# Patient Record
Sex: FEMALE | Race: WHITE | NOT HISPANIC OR LATINO | Employment: STUDENT | URBAN - METROPOLITAN AREA
[De-identification: names, ages, dates, MRNs, and addresses within clinical notes are randomized per-mention and may not be internally consistent; named-entity substitution may affect disease eponyms.]

---
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
## 2025-05-25 ENCOUNTER — HOSPITAL ENCOUNTER (EMERGENCY)
Facility: HOSPITAL | Age: 25
Discharge: HOME | End: 2025-05-25
Attending: EMERGENCY MEDICINE
Payer: MEDICAID

## 2025-05-25 VITALS
RESPIRATION RATE: 16 BRPM | HEIGHT: 56 IN | SYSTOLIC BLOOD PRESSURE: 154 MMHG | WEIGHT: 102.5 LBS | HEART RATE: 105 BPM | TEMPERATURE: 98 F | BODY MASS INDEX: 23.06 KG/M2 | OXYGEN SATURATION: 100 % | DIASTOLIC BLOOD PRESSURE: 81 MMHG

## 2025-05-25 DIAGNOSIS — S99.921A TOE INJURY, RIGHT, INITIAL ENCOUNTER: Primary | ICD-10-CM

## 2025-05-25 DIAGNOSIS — Z20.2 POSSIBLE EXPOSURE TO STD: ICD-10-CM

## 2025-05-25 DIAGNOSIS — N39.0 URINARY TRACT INFECTION WITH HEMATURIA, SITE UNSPECIFIED: ICD-10-CM

## 2025-05-25 DIAGNOSIS — R31.9 URINARY TRACT INFECTION WITH HEMATURIA, SITE UNSPECIFIED: ICD-10-CM

## 2025-05-25 LAB
B-HCG UR QL: NEGATIVE
BACTERIA #/AREA URNS AUTO: ABNORMAL /HPF
BACTERIA GENITAL QL WET PREP: ABNORMAL
BILIRUB UR QL STRIP.AUTO: NEGATIVE
CLARITY UR: ABNORMAL
CLUE CELLS VAG QL WET PREP: ABNORMAL
COLOR UR AUTO: YELLOW
CTP QC/QA: YES
FILAMENT FUNGI VAG WET PREP-#/AREA: ABNORMAL
GLUCOSE UR QL STRIP: NEGATIVE
HGB UR QL STRIP: NEGATIVE
HOLD SPECIMEN: NORMAL
HYALINE CASTS UR QL AUTO: 0 /LPF (ref 0–1)
KETONES UR QL STRIP: NEGATIVE
LEUKOCYTE ESTERASE UR QL STRIP: ABNORMAL
MICROSCOPIC COMMENT: ABNORMAL
NITRITE UR QL STRIP: NEGATIVE
PH UR STRIP: 7 [PH]
PROT UR QL STRIP: ABNORMAL
RBC #/AREA URNS AUTO: 8 /HPF (ref 0–4)
SP GR UR STRIP: 1.02
SQUAMOUS #/AREA URNS AUTO: 27 /HPF
T VAGINALIS GENITAL QL WET PREP: ABNORMAL
UROBILINOGEN UR STRIP-ACNC: NEGATIVE EU/DL
WBC #/AREA URNS AUTO: >100 /HPF (ref 0–5)
WBC #/AREA VAG WET PREP: ABNORMAL
WBC CLUMPS UR QL AUTO: ABNORMAL
YEAST GENITAL QL WET PREP: ABNORMAL

## 2025-05-25 PROCEDURE — 63600175 PHARM REV CODE 636 W HCPCS

## 2025-05-25 PROCEDURE — 87591 N.GONORRHOEAE DNA AMP PROB: CPT

## 2025-05-25 PROCEDURE — 96372 THER/PROPH/DIAG INJ SC/IM: CPT

## 2025-05-25 PROCEDURE — 87086 URINE CULTURE/COLONY COUNT: CPT | Performed by: NURSE PRACTITIONER

## 2025-05-25 PROCEDURE — 87210 SMEAR WET MOUNT SALINE/INK: CPT

## 2025-05-25 PROCEDURE — 99284 EMERGENCY DEPT VISIT MOD MDM: CPT | Mod: 25

## 2025-05-25 PROCEDURE — 81025 URINE PREGNANCY TEST: CPT | Performed by: NURSE PRACTITIONER

## 2025-05-25 PROCEDURE — 81003 URINALYSIS AUTO W/O SCOPE: CPT | Performed by: NURSE PRACTITIONER

## 2025-05-25 RX ORDER — CEFTRIAXONE 500 MG/1
500 INJECTION, POWDER, FOR SOLUTION INTRAMUSCULAR; INTRAVENOUS
Status: COMPLETED | OUTPATIENT
Start: 2025-05-25 | End: 2025-05-25

## 2025-05-25 RX ORDER — NITROFURANTOIN 25; 75 MG/1; MG/1
100 CAPSULE ORAL 2 TIMES DAILY
Qty: 10 CAPSULE | Refills: 0 | Status: SHIPPED | OUTPATIENT
Start: 2025-05-25 | End: 2025-05-30

## 2025-05-25 RX ADMIN — CEFTRIAXONE SODIUM 500 MG: 500 INJECTION, POWDER, FOR SOLUTION INTRAMUSCULAR; INTRAVENOUS at 04:05

## 2025-05-25 NOTE — ED NOTES
Contacted lab about C. trachomatis/N. Gonorrhoeae testing and existing urine sample already in lab. Lab states cannot find sample.

## 2025-05-25 NOTE — ED NOTES
Discussed discharge paperwork and Rx sent to pharmacy. Patient verbally understood. Told to look out for phone call about results of wet prep testing. Ambulatory with a steady gait out of ED.

## 2025-05-25 NOTE — ED NOTES
Patient provided urine sample cup and explained need for sample. Ambulatory without assistance to restroom.

## 2025-05-25 NOTE — ED NOTES
Patient reports dysuria, hematuria, and urinary frequency that started ~4 days ago. States she has attempted AZO and cranberry juice with no relief. Also complaining of right 5th toe pain that started after striking it ~2 days ago. Toe appears red, tender to touch. Ambulatory with a steady gait.

## 2025-05-25 NOTE — DISCHARGE INSTRUCTIONS
Thank you for letting me care for you today - it was nice to meet you and I hope you feel better soon. I have placed a referral to OBGYN for follow up care. You can call [phone] ([phone]) to schedule. You can also schedule on the [facility]. I have placed a referral to [facility] for Primary Care. You can call [phone] to schedule.     I sent in the following medication to your pharmacy:   Macrobid: 2 times per day for 5 days    Our goal at [facility] is to always give you outstanding care and exceptional service. You may receive a survey by mail or email in the next week about your experience in our ED. We would greatly appreciate you completing and returning the survey. Your feedback provides us with a way to recognize our staff who give very good care and it helps us learn how to improve when your experience was below our aspiration of excellence.     All the best,     Mercedes Villeda, MPH, PA-C  Emergency Department Physician Assistant  [facility], [facility], [facility]

## 2025-05-25 NOTE — FIRST PROVIDER EVALUATION
Emergency Department TeleTriage Encounter Note      CHIEF COMPLAINT    Chief Complaint   Patient presents with    Urinary Tract Infection     Hematuria, urinary frequency, and dysuria x 4 days. Denies relief with azo and cranberry juice.     Toe Injury     Right 5th toe pain x 2 days after hitting it.       VITAL SIGNS   Initial Vitals [05/25/25 1403]   BP Pulse Resp Temp SpO2   (!) 154/81 105 16 98.2 °F (36.8 °C) 100 %      MAP       --            ALLERGIES    Review of patient's allergies indicates:  No Known Allergies    PROVIDER TRIAGE NOTE  Verbal consent for the teletriage evaluation was given by the patient at the start of the evaluation.  All efforts will be made to maintain patient's privacy during the evaluation.      This is a teletriage evaluation of a 24 y.o. female presenting to the ED with c/o dysuria, frequency that started 5 days ago.  Right 5th toe injury 2 nights ago. Limited physical exam via telehealth: The patient is awake, alert, answering questions appropriately and is not in respiratory distress.  As the Teletriage provider, I performed an initial assessment and ordered appropriate labs and imaging studies, if any, to facilitate the patient's care once placed in the ED. Once a room is available, care and a full evaluation will be completed by an alternate ED provider.  Any additional orders and the final disposition will be determined by that provider.  All imaging and labs will not be followed-up by the Teletriage Team, including myself.          ORDERS  Labs Reviewed   URINALYSIS, REFLEX TO URINE CULTURE   POCT URINE PREGNANCY       ED Orders (720h ago, onward)      Start Ordered     Status Ordering Provider    05/25/25 1417 05/25/25 1416  X-Ray Foot Complete Right  1 time imaging         Ordered PAUL ASHFORD    05/25/25 1416 05/25/25 1415  POCT urine pregnancy  Once         Ordered PAUL ASHFORD    05/25/25 1416 05/25/25 1415  Urinalysis, Reflex to Urine Culture Urine, Clean Catch  STAT          Ordered PAUL ASHFORD A              Virtual Visit Note: The provider triage portion of this emergency department evaluation and documentation was performed via [employer], a HIPAA-compliant telemedicine application, in concert with a tele-presenter in the room. A face to face patient evaluation with one of my colleagues will occur once the patient is placed in an emergency department room.      DISCLAIMER: This note was prepared with [employer] voice recognition transcription software. Garbled syntax, mangled pronouns, and other bizarre constructions may be attributed to that software system.

## 2025-05-26 ENCOUNTER — RESULTS FOLLOW-UP (OUTPATIENT)
Dept: EMERGENCY MEDICINE | Facility: HOSPITAL | Age: 25
End: 2025-05-26

## 2025-05-27 LAB
BACTERIA UR CULT: ABNORMAL
C TRACH DNA SPEC QL NAA+PROBE: NOT DETECTED
CTGC SOURCE (OHS) ORD-325: NORMAL
N GONORRHOEA DNA UR QL NAA+PROBE: NOT DETECTED

## 2025-05-27 NOTE — ED PROVIDER NOTES
Encounter Date: 5/25/2025       History     Chief Complaint   Patient presents with    Urinary Tract Infection     Hematuria, urinary frequency, and dysuria x 4 days. Denies relief with azo and cranberry juice.     Toe Injury     Right 5th toe pain x 2 days after hitting it.     Patient is a 24 y.o. female who presents to the ED for evaluation of multiple complaints.     Firstly, patient reports pain and bruising to her right 5th toe after accidentally kicking something 2 days ago. Is able to ambulate. No damage to nailbed.     Secondly, patient reports hematuria, urinary frequency, dysuria x4 days.  Patient has been taking azo and cranberry juice without relief.  Patient does endorse recent unprotected sex.  Says that a few days ago she did have slight increase amount vaginal discharge.  Patient concerned about possible STD exposure. Fever and chills are not reported.  Abdominal pain is not reported. Flank pain is not reported. Patient denies gross hematuria. There is not accompanying nausea, vomiting.     Denies chest pain, shortness of breath, wheezing, stridor, drooling, joint problems, rashes, or any other complaints at this time.          The history is provided by the patient.     Review of patient's allergies indicates:  No Known Allergies  Past Medical History:   Diagnosis Date    ADHD (attention deficit hyperactivity disorder)     Asthma     Depression      No past surgical history on file.  No family history on file.  Social History[1]  Review of Systems   Constitutional:  Negative for chills and fever.   Genitourinary:  Positive for dysuria, frequency, hematuria and vaginal discharge. Negative for difficulty urinating.   Musculoskeletal:  Positive for arthralgias (right 5th toe).   Skin:  Positive for color change (toe bruising).       Physical Exam     Initial Vitals [05/25/25 1403]   BP Pulse Resp Temp SpO2   (!) 154/81 105 16 98.2 °F (36.8 °C) 100 %      MAP       --         Physical  Exam    Constitutional: She appears well-developed and well-nourished.   HENT:   Head: Normocephalic and atraumatic.   Abdominal: She exhibits no distension. There is no abdominal tenderness.   No right CVA tenderness.  No left CVA tenderness. There is no rebound and no guarding.   Genitourinary:    Genitourinary Comments: Declined  exam     Musculoskeletal:        Feet:      Neurological: She is alert.         ED Course   Procedures  Labs Reviewed   CULTURE, URINE - Abnormal       Result Value    Urine Culture >100,000 cfu/ml Gram-negative Rods (*)    WET PREP, GENITAL - Abnormal    WBC Rare (*)     Bacteria Rare (*)     Clue Cells None      TRICHOMONAS  None      BUDDING YEAST None      FUNGAL HYPHAE None     URINALYSIS, REFLEX TO URINE CULTURE - Abnormal    Color, UA Yellow      Appearance, UA Hazy (*)     pH, UA 7.0      Spec Grav UA 1.025      Protein, UA 1+ (*)     Glucose, UA Negative      Ketones, UA Negative      Bilirubin, UA Negative      Blood, UA Negative      Nitrites, UA Negative      Urobilinogen, UA Negative      Leukocyte Esterase, UA 3+ (*)    URINALYSIS MICROSCOPIC - Abnormal    RBC, UA 8 (*)     WBC, UA >100 (*)     WBC Clumps, UA Few (*)     Bacteria, UA Many (*)     Squamous Epithelial Cells, UA 27      Hyaline Casts, UA 0      Microscopic Comment       GREY TOP URINE HOLD    Extra Tube Hold for add-ons.     C. TRACHOMATIS/N. GONORRHOEAE BY AMP DNA   POCT URINE PREGNANCY    POC Preg Test, Ur Negative       Acceptable Yes            Imaging Results              X-Ray Foot Complete Right (Final result)  Result time 05/25/25 15:38:17      Final result by Eder Sorto MD (05/25/25 15:38:17)                   Impression:      1. No acute displaced fracture or dislocation of the foot.      Electronically signed by: Eder Sorto MD  Date:    05/25/2025  Time:    15:38               Narrative:    EXAMINATION:  XR FOOT COMPLETE 3 VIEW RIGHT    CLINICAL HISTORY:  . Injury,  unspecified, initial encounter    TECHNIQUE:  AP, lateral, and oblique views of the right foot were performed.    COMPARISON:  None    FINDINGS:  Three views right foot.    No acute displaced fracture or dislocation foot.  No radiopaque foreign body.  No significant edema.                                       Medications   cefTRIAXone injection 500 mg (500 mg Intramuscular Given 5/25/25 1633)     Medical Decision Making  Patient is an afebrile, well-appearing 24 y.o. female who here with urinary symptoms and toe injury. Exam without any abdominal or CVAT. Patient is not tolerating PO. VSS.     Differential diagnosis for urinary symptoms includes but is not limited to cystitis, pyelonephritis, ureterolithiasis, nephrolithiasis, chlamydia, gonorrhea, vaginal candidiasis, bacterial vaginosis, Trichomonas, PID, fracture, dislocation, cellulitis, bursitis, muscle strain, ligament tear/sprain, soft tissue contusion, osteoarthritis, subungual hematoma    All historical, clinical, radiographic, findings were reviewed with the patient in detail. UA is is suggestive of UTI will treat with macrobid. UC sent. G/C pending. Vaginal screen not suggestive of BV, candidiasis, trich. Will empirically treat with rocephin. Xray of toe without acute abnormality. There are no concerning features on physical exam to suggest an emergent or life threatening condition. Vital signs do not suggest sepsis. Patient has been counseled regarding the need for follow-up as well as the indication to return to the emergency room should new or worrisome developments occur. Patient instructed to follow up with PCP  for follow up care. The patient verbalized an understanding. The patient is asked if there are any questions or concerns. Patient given discharge instructions. We discuss the case, until all issues are addressed to the patient's satisfaction. Patient understands and is agreeable to the plan.     Amount and/or Complexity of Data  Reviewed  Labs: ordered. Decision-making details documented in ED Course.    Risk  Prescription drug management.               ED Course as of 05/27/25 1432   Sun May 25, 2025   1539 Leukocyte Esterase, UA(!): 3+ [OB]   1539 NITRITE UA: Negative [OB]      ED Course User Index  [OB] Mercedes Villeda PA-C                           Clinical Impression:  Final diagnoses:  [S99.921A] Toe injury, right, initial encounter (Primary)  [Z20.2] Possible exposure to STD  [N39.0, R31.9] Urinary tract infection with hematuria, site unspecified          ED Disposition Condition    Discharge Stable          ED Prescriptions       Medication Sig Dispense Start Date End Date Auth. Provider    nitrofurantoin, macrocrystal-monohydrate, (MACROBID) 100 MG capsule Take 1 capsule (100 mg total) by mouth 2 (two) times daily. for 5 days 10 capsule 5/25/2025 5/30/2025 Mercedes Villeda PA-C          Follow-up Information       Follow up With Specialties Details Why Contact Info Additional Information    [facility] Family Medicine Schedule an appointment as soon as possible for a visit   [address]  [address]  [facility] [address] [phone] Please park in [address] or [address] and use [address]rer. Take Medical Office Bldg. elevators.                   [1]   Social History  Tobacco Use    Smoking status: Never    Smokeless tobacco: Never   Substance Use Topics    Alcohol use: No    Drug use: No        Mercedes Villeda PA-C  05/27/25 1432

## 2025-06-23 ENCOUNTER — TELEPHONE (OUTPATIENT)
Dept: GASTROENTEROLOGY | Facility: CLINIC | Age: 25
End: 2025-06-23
Payer: MEDICAID

## 2025-06-24 ENCOUNTER — HOSPITAL ENCOUNTER (OUTPATIENT)
Facility: HOSPITAL | Age: 25
Discharge: HOME | End: 2025-06-24
Payer: MEDICAID

## 2025-06-24 ENCOUNTER — OFFICE VISIT (OUTPATIENT)
Dept: FAMILY MEDICINE | Facility: HOSPITAL | Age: 25
End: 2025-06-24
Payer: MEDICAID

## 2025-06-24 ENCOUNTER — HOSPITAL ENCOUNTER (OUTPATIENT)
Dept: RADIOLOGY | Facility: HOSPITAL | Age: 25
Discharge: HOME | End: 2025-06-24
Payer: MEDICAID

## 2025-06-24 VITALS
RESPIRATION RATE: 18 BRPM | SYSTOLIC BLOOD PRESSURE: 121 MMHG | OXYGEN SATURATION: 99 % | BODY MASS INDEX: 23.45 KG/M2 | HEART RATE: 71 BPM | DIASTOLIC BLOOD PRESSURE: 87 MMHG | WEIGHT: 104.25 LBS | HEIGHT: 56 IN

## 2025-06-24 DIAGNOSIS — Z13.220 ENCOUNTER FOR SCREENING FOR LIPID DISORDER: ICD-10-CM

## 2025-06-24 DIAGNOSIS — Z11.4 ENCOUNTER FOR SCREENING FOR HIV: ICD-10-CM

## 2025-06-24 DIAGNOSIS — Z11.59 ENCOUNTER FOR HEPATITIS C SCREENING TEST FOR LOW RISK PATIENT: ICD-10-CM

## 2025-06-24 DIAGNOSIS — F32.A DEPRESSION, UNSPECIFIED DEPRESSION TYPE: ICD-10-CM

## 2025-06-24 DIAGNOSIS — M89.9 MULTIFOCAL ABNORMALITY OF BONE: Primary | ICD-10-CM

## 2025-06-24 DIAGNOSIS — M89.9 MULTIFOCAL ABNORMALITY OF BONE: ICD-10-CM

## 2025-06-24 DIAGNOSIS — F41.9 ANXIETY: ICD-10-CM

## 2025-06-24 PROCEDURE — 72082 X-RAY EXAM ENTIRE SPI 2/3 VW: CPT | Mod: TC,PN

## 2025-06-24 PROCEDURE — 72082 X-RAY EXAM ENTIRE SPI 2/3 VW: CPT | Mod: 26,,, | Performed by: RADIOLOGY

## 2025-06-24 PROCEDURE — 99214 OFFICE O/P EST MOD 30 MIN: CPT | Mod: 25

## 2025-06-24 PROCEDURE — 73560 X-RAY EXAM OF KNEE 1 OR 2: CPT | Mod: TC,50,FY

## 2025-06-24 PROCEDURE — 73560 X-RAY EXAM OF KNEE 1 OR 2: CPT | Mod: 26,50,, | Performed by: RADIOLOGY

## 2025-06-24 RX ORDER — SERTRALINE HYDROCHLORIDE 50 MG/1
50 TABLET, FILM COATED ORAL DAILY
Qty: 30 TABLET | Refills: 11 | Status: SHIPPED | OUTPATIENT
Start: 2025-06-24 | End: 2026-06-24

## 2025-06-24 NOTE — PROGRESS NOTES
"Clinic Note  [facility]    Subjective:     Aniyah Anderson is a 24 y.o. year old who presents to clinic today to establish care.    Patient moved back to [place] in February from [place]. Was diagnosed with Fibromyalgia and early-onset arthritis in [place] and is seeking "referrals."    Anxiety/Depression:  - "Lot of trauma."  - Was on Sertraline, wants to be back on it.  - Previous therapy: Sertraline 100mg    Bone abnormality:  - Family history of same bone disease in father. "Gums tight, doesn't allow circulation to teeth." Bones reportedly deteriorate early. Father had hip surgery at 30. Dad was 5'7 in height. Mom was 5'1 in height.  - Bone Age Study 2014: Patient was 14 years old. Bone age on imaging showed 18 years, 2.9 standard deviations above average.    Fibromyalgia:  - Diagnosed by Dr. Salome Segura MD, Rheumatologist at [facility]. Rheumatological lab work largely negative on portal.    IBS:  - Current regimen: Ibgard    Family history:  - Breast cancer, grandmother's sister.  - Lung cancer, father's mom.      Review of Systems negative except for symptoms mentioned in HPI    Gynecological History:  - Menstruation: Patient's last menstrual period was 2025.  - Mammogram: N/A  - Pap smear:  - Sexual History:   - Contraception:    Obstetric History:     Health Maintenance         Date Due Completion Date    Pap Smear Never done ---    TETANUS VACCINE 2022    COVID-19 Vaccine ( season) Never done ---    Influenza Vaccine (Season Ended) 2025    Chlamydia Screening 2026    RSV Vaccine (Age 60+ and Pregnant patients) (1 - 1-dose 75+ series) 2076 ---            Past Medical History:   Diagnosis Date    ADHD (attention deficit hyperactivity disorder)     Asthma     Depression        No past surgical history on file.    No family history on file.    Social History     Socioeconomic History    Marital " status: Single   Tobacco Use    Smoking status: Never    Smokeless tobacco: Never   Substance and Sexual Activity    Alcohol use: No    Drug use: No    Sexual activity: Yes     Partners: Male       Current Medications[1]    Review of patient's allergies indicates:  No Known Allergies      Objective:     Vitals:    06/24/25 0905   BP: 121/87   Pulse: 71   Resp: 18       Wt Readings from Last 1 Encounters:   06/24/25 47.3 kg (104 lb 4.4 oz)       Physical Exam  Constitutional:       General: She is not in acute distress.  HENT:      Mouth/Throat:      Comments: Picture of teeth below  Eyes:      General:         Right eye: No discharge.         Left eye: No discharge.      Conjunctiva/sclera: Conjunctivae normal.      Comments: Glasses   Cardiovascular:      Rate and Rhythm: Normal rate.   Pulmonary:      Effort: Pulmonary effort is normal. No respiratory distress.   Musculoskeletal:      Right lower leg: No edema.      Left lower leg: No edema.      Comments: Short stature  Mild pain in straightening back  No grossly obvious scoliosis of spine, but asymmetry of hips when bending forward   Neurological:      Mental Status: She is alert and oriented to person, place, and time.      Gait: Gait normal.   Psychiatric:         Mood and Affect: Mood normal.         Behavior: Behavior normal.         Assessment/Plan:     Aniyah was seen today for establish care.    Diagnoses and all orders for this visit:    Multifocal abnormality of bone  Chronic condition. Unsure what this is at the present time. Will obtain spinal imaging to assess for scoliosis. With similar presentation in dad, will refer to  for further work up. If imaging reveals degradation, will refer to ortho.  -     Ambulatory referral/consult to Genetics; Future  -     X-Ray Spine Scoliosis 6 or More Views; Future  -     X-Ray Knee 1 or 2 View Bilateral; Future    Anxiety  Depression, unspecified depression type  Chronic condition. Uncontrolled. Triggers  still present. Will restart Sertraline at smaller dose and increase as necessary.  -     sertraline (ZOLOFT) 50 MG tablet; Take 1 tablet (50 mg total) by mouth once daily. Start by cutting the pill in half and taking only half a tablet a day for the first week.    Encounter for screening for lipid disorder  -     Lipid Panel; Future  -     CBC Auto Differential; Future  -     Comprehensive Metabolic Panel; Future    Encounter for hepatitis C screening test for low risk patient  -     HEPATITIS C ANTIBODY; Future    Encounter for screening for HIV  -     HIV 1/2 Ag/Ab (4th Gen); Future      Follow up in about 2 weeks for lab and imaging follow up.    Case discussed with staff: Dr. Rodrick Smith MD PGY-2  [facility]        [1]   Current Outpatient Medications   Medication Sig Dispense Refill    methylphenidate (CONCERTA) 27 MG CR tablet TK 1 T PO  QD IN THE MORNING  0    VENTOLIN HFA 90 mcg/actuation inhaler INL 2 PFS PO Q 4-6 H PRN  1    sertraline (ZOLOFT) 50 MG tablet Take 1 tablet (50 mg total) by mouth once daily. Start by cutting the pill in half and taking only half a tablet a day for the first week. 30 tablet 11     No current facility-administered medications for this visit.

## 2025-06-30 ENCOUNTER — OFFICE VISIT (OUTPATIENT)
Dept: GASTROENTEROLOGY | Facility: CLINIC | Age: 25
End: 2025-06-30
Payer: MEDICAID

## 2025-06-30 VITALS
SYSTOLIC BLOOD PRESSURE: 118 MMHG | WEIGHT: 102.88 LBS | HEART RATE: 63 BPM | DIASTOLIC BLOOD PRESSURE: 82 MMHG | HEIGHT: 56 IN | BODY MASS INDEX: 23.15 KG/M2

## 2025-06-30 DIAGNOSIS — K58.0 IRRITABLE BOWEL SYNDROME WITH DIARRHEA: Primary | ICD-10-CM

## 2025-06-30 PROCEDURE — 1160F RVW MEDS BY RX/DR IN RCRD: CPT | Mod: CPTII,,, | Performed by: NURSE PRACTITIONER

## 2025-06-30 PROCEDURE — 1159F MED LIST DOCD IN RCRD: CPT | Mod: CPTII,,, | Performed by: NURSE PRACTITIONER

## 2025-06-30 PROCEDURE — 99999 PR PBB SHADOW E&M-EST. PATIENT-LVL III: CPT | Mod: PBBFAC,,, | Performed by: NURSE PRACTITIONER

## 2025-06-30 PROCEDURE — 99204 OFFICE O/P NEW MOD 45 MIN: CPT | Mod: S$PBB,,, | Performed by: NURSE PRACTITIONER

## 2025-06-30 PROCEDURE — 3008F BODY MASS INDEX DOCD: CPT | Mod: CPTII,,, | Performed by: NURSE PRACTITIONER

## 2025-06-30 PROCEDURE — 3074F SYST BP LT 130 MM HG: CPT | Mod: CPTII,,, | Performed by: NURSE PRACTITIONER

## 2025-06-30 PROCEDURE — 3079F DIAST BP 80-89 MM HG: CPT | Mod: CPTII,,, | Performed by: NURSE PRACTITIONER

## 2025-06-30 PROCEDURE — 99213 OFFICE O/P EST LOW 20 MIN: CPT | Mod: PBBFAC,PN | Performed by: NURSE PRACTITIONER

## 2025-06-30 RX ORDER — DICYCLOMINE HYDROCHLORIDE 20 MG/1
20 TABLET ORAL
Qty: 120 TABLET | Refills: 5 | Status: SHIPPED | OUTPATIENT
Start: 2025-06-30

## 2025-06-30 NOTE — PROGRESS NOTES
Subjective:       Patient ID: Aniyah Anderson is a 24 y.o. female.    Chief Complaint: Diarrhea and Rectal Bleeding    25 y/o female presents to clinic with c/o diarrhea and rectal bleeding. Hx of IBS-D. Previously established with Dr. En Eugene in [place], [place]; last seen in 2023. EGD and colonoscopy were normal with exception of mild gastritis and IH. Symptoms previously controlled with IBgard and Bentyl. Patient reports 3-5 BMs daily with loose, watery stool. Has intermittent rectal bleeding with BM. Denies rectal pain. Lower abdominal cramping relieved by BM.       Endoscopy History  - 10/12/2023: Internal hemorrhoids noted; otherwise exam normal; biopsies (-) IBD or microscopic colitis  - 11/17/2023 EGD: normal esophagus; gastritis biopsied; normal examined duodenum.    Past Medical History:   Diagnosis Date    ADHD (attention deficit hyperactivity disorder)     Anxiety disorder, unspecified     Asthma     Depression        History reviewed. No pertinent surgical history.    No family history on file.    Social History     Socioeconomic History    Marital status: Single   Tobacco Use    Smoking status: Never    Smokeless tobacco: Never   Substance and Sexual Activity    Alcohol use: No    Drug use: No    Sexual activity: Yes     Partners: Male     Social Drivers of Health     Financial Resource Strain: Unknown (12/28/2020)    Received from [facility]    Overall Financial Resource Strain (CARDIA)     Difficulty of Paying Living Expenses: Patient declined   Food Insecurity: Unknown (12/28/2020)    Received from [facility]    Hunger Vital Sign     Worried About Running Out of Food in the Last Year: Patient declined     Ran Out of Food in the Last Year: Patient declined   Transportation Needs: Unknown (12/28/2020)    Received from [facility]    PRAPARE - Transportation     Lack of Transportation  "(Medical): Patient declined     Lack of Transportation (Non-Medical): Patient declined       Review of Systems   Constitutional:  Negative for appetite change and unexpected weight change.   HENT:  Negative for trouble swallowing.    Respiratory:  Negative for shortness of breath.    Cardiovascular:  Negative for chest pain.   Gastrointestinal:  Positive for abdominal pain and diarrhea.   Hematological:  Negative for adenopathy. Does not bruise/bleed easily.   Psychiatric/Behavioral:  Negative for dysphoric mood.          Objective:     Vitals:    06/30/25 0836   BP: 118/82   BP Location: Right arm   Patient Position: Sitting   Pulse: 63   Weight: 46.6 kg (102 lb 13.5 oz)   Height: 4' 8" (1.422 m)          Physical Exam  Constitutional:       General: She is not in acute distress.  HENT:      Head: Normocephalic.   Eyes:      Conjunctiva/sclera: Conjunctivae normal.   Pulmonary:      Effort: Pulmonary effort is normal. No respiratory distress.   Abdominal:      General: Bowel sounds are normal.      Palpations: Abdomen is soft.      Tenderness: There is no abdominal tenderness.   Musculoskeletal:         General: Normal range of motion.      Cervical back: Normal range of motion.   Skin:     General: Skin is warm and dry.   Neurological:      Mental Status: She is alert and oriented to person, place, and time.   Psychiatric:         Mood and Affect: Mood normal.         Behavior: Behavior normal.               Assessment:         ICD-10-CM ICD-9-CM   1. Irritable bowel syndrome with diarrhea  K58.0 564.1       Plan:       Irritable bowel syndrome with diarrhea  -      IBS diet  -     dicyclomine (BENTYL) 20 mg tablet; Take 1 tablet (20 mg total) by mouth 4 (four) times daily before meals and nightly.  Dispense: 120 tablet; Refill: 5      Follow up in about 1 month (around 7/30/2025) for medication management, If symptoms worsen or fail to improve.     Patient's Medications   New Prescriptions    DICYCLOMINE (BENTYL) " 20 MG TABLET    Take 1 tablet (20 mg total) by mouth 4 (four) times daily before meals and nightly.   Previous Medications    SERTRALINE (ZOLOFT) 50 MG TABLET    Take 1 tablet (50 mg total) by mouth once daily. Start by cutting the pill in half and taking only half a tablet a day for the first week.   Modified Medications    No medications on file   Discontinued Medications    METHYLPHENIDATE (CONCERTA) 27 MG CR TABLET    TK 1 T PO  QD IN THE MORNING    VENTOLIN HFA 90 MCG/ACTUATION INHALER    INL 2 PFS PO Q 4-6 H PRN

## 2025-07-03 ENCOUNTER — TELEPHONE (OUTPATIENT)
Dept: GENETICS | Facility: CLINIC | Age: 25
End: 2025-07-03
Payer: MEDICAID

## 2025-07-09 ENCOUNTER — PATIENT MESSAGE (OUTPATIENT)
Dept: FAMILY MEDICINE | Facility: HOSPITAL | Age: 25
End: 2025-07-09
Payer: MEDICAID

## 2025-07-09 ENCOUNTER — TELEPHONE (OUTPATIENT)
Dept: FAMILY MEDICINE | Facility: HOSPITAL | Age: 25
End: 2025-07-09
Payer: MEDICAID

## 2025-07-09 NOTE — TELEPHONE ENCOUNTER
"Contacted patient to inform her appointment will need to be rescheduled due to provider not being in office on 7/9/2025. Patient phones states the caller has "phone restrictions. She patient a portal message.    "

## 2025-07-10 ENCOUNTER — OFFICE VISIT (OUTPATIENT)
Dept: OBSTETRICS AND GYNECOLOGY | Facility: CLINIC | Age: 25
End: 2025-07-10
Payer: MEDICAID

## 2025-07-10 ENCOUNTER — LAB VISIT (OUTPATIENT)
Dept: LAB | Facility: HOSPITAL | Age: 25
End: 2025-07-10
Payer: MEDICAID

## 2025-07-10 VITALS
DIASTOLIC BLOOD PRESSURE: 83 MMHG | WEIGHT: 105.19 LBS | BODY MASS INDEX: 23.58 KG/M2 | SYSTOLIC BLOOD PRESSURE: 121 MMHG | HEART RATE: 85 BPM

## 2025-07-10 DIAGNOSIS — N92.6 MISSED PERIOD: ICD-10-CM

## 2025-07-10 DIAGNOSIS — Z31.9 DESIRE FOR PREGNANCY: ICD-10-CM

## 2025-07-10 DIAGNOSIS — N76.0 ACUTE VAGINITIS: ICD-10-CM

## 2025-07-10 DIAGNOSIS — N39.0 URINARY TRACT INFECTION WITH HEMATURIA, SITE UNSPECIFIED: ICD-10-CM

## 2025-07-10 DIAGNOSIS — R31.9 URINARY TRACT INFECTION WITH HEMATURIA, SITE UNSPECIFIED: ICD-10-CM

## 2025-07-10 DIAGNOSIS — Z01.419 WELL WOMAN EXAM WITH ROUTINE GYNECOLOGICAL EXAM: Primary | ICD-10-CM

## 2025-07-10 LAB — HCG INTACT+B SERPL-ACNC: <1.2 MIU/ML

## 2025-07-10 PROCEDURE — 99999 PR PBB SHADOW E&M-EST. PATIENT-LVL III: CPT | Mod: PBBFAC,,,

## 2025-07-10 PROCEDURE — 99213 OFFICE O/P EST LOW 20 MIN: CPT | Mod: PBBFAC,PO

## 2025-07-10 PROCEDURE — 84702 CHORIONIC GONADOTROPIN TEST: CPT

## 2025-07-10 PROCEDURE — 36415 COLL VENOUS BLD VENIPUNCTURE: CPT

## 2025-07-10 NOTE — PROGRESS NOTES
SUBJECTIVE:   24 y.o. female  presents for annual well woman exam.   Patient's last menstrual period was 2025 (exact date).. Age of first menarche: 10. Periods are mostly regular, heavy, lasting 4-5 days. +dysmenorrhea for the first 2 days. Reports increased stress at home with family, has missed periods during months that she is more stressed. This occurs <2-3 times per year. She is requesting a blood pregnancy test today. She is currently finishing her period, but is unsure if it was a miscarriage. Is currently sexually active with both males and females. Condoms occasionally for contraception. Declines STD testing with blood work. Recent negative STD screening a couple weeks ago.   She has creamy white discharge prior and after her periods. No history of BV.  Denies any abnormal bleeding, vaginal pain, itching, irritation, or odor.   Denies any breast, urinary, or bowel complaints.   History of chlamydia infection . Recent UTI, treatment with macrobid. Reports frequent UTI's. She takes Azo preventative daily.   She would like to discuss fertility.    Denies domestic violence. Recently rejoined with birth family.  She was raised by her grandmother. She does not have a relationship with her mom. She just moved back from [place].   Does have a PCP    +vapes daily +occasional alcohol +marijuana use occasionally  +walking and swimming for exercise    Family history: Unknown if any family history of breast, ovarian, endometrial and colon cancer    Pap-no prior pap           Past Medical History:   Diagnosis Date    ADHD (attention deficit hyperactivity disorder)     Anxiety disorder, unspecified     Asthma     Depression      History reviewed. No pertinent surgical history.  Social History[1]  No family history on file.  OB History    Para Term  AB Living   0 0 0 0 0 0   SAB IAB Ectopic Multiple Live Births   0 0 0 0          Current Medications[2]  Allergies: Patient has no known allergies.      ROS:  See HPI      OBJECTIVE:   The patient appears well, alert, oriented x 3, in no distress.  /83   Pulse 85   Wt 47.7 kg (105 lb 2.6 oz)   LMP 07/05/2025 (Exact Date)   BMI 23.58 kg/m²   NECK: no thyromegaly, trachea midline  SKIN: no acne, striae, hirsutism  BREAST EXAM: breasts appear normal, no suspicious masses, no skin or nipple changes or axillary nodes  ABDOMEN: no hernias, masses, or hepatosplenomegaly  GENITALIA: normal external genitalia, no erythema, no discharge  URETHRA: normal urethra, normal urethral meatus  VAGINA: vaginal discharge white, thin, and watery  CERVIX: no lesions or cervical motion tenderness  UTERUS: normal  ADNEXA: no mass, fullness, tenderness      ASSESSMENT:   Aniyah was seen today for annual exam.    Diagnoses and all orders for this visit:    Well woman exam with routine gynecological exam  -     Liquid-Based Pap Smear, Screening    Urinary tract infection with hematuria, site unspecified  -     Ambulatory referral/consult to Obstetrics / Gynecology  -     Cancel: Ambulatory referral/consult to Urology; Future    Acute vaginitis  -     Cancel: Vaginosis Screen by DNA Probe  -     Vaginosis Screen by DNA Probe    Missed period  -     HCG, Quantitative; Future    Desire for pregnancy        Orders Placed This Encounter   Procedures    Vaginosis Screen by DNA Probe    HCG, Quantitative     Preconception counseling   - counseled patient to start taking PNV with folic acid   - reviewed dietary recommendations, exercise  - discussed cycle tracking with use of ovulation predictor kits to time intercourse   - if no pregnancy in 1 year, can discuss further evaluation as desired   - patient to call when she has positive pregnancy test at home to schedule prenatal care     Follow up in 1 year for annual exam or as needed.  Caron Muniz PA-C         [1]   Social History  Socioeconomic History    Marital status: Single   Tobacco Use    Smoking status: Some Days     Types:  Cigars    Smokeless tobacco: Never    Tobacco comments:     Vape   Substance and Sexual Activity    Alcohol use: No     Comment: Ocassionally    Drug use: Yes     Types: Marijuana     Comment: Once every couple months    Sexual activity: Yes     Partners: Male, Female     Birth control/protection: Condom     Social Drivers of Health     Financial Resource Strain: Unknown (12/28/2020)    Received from [facility]    Overall Financial Resource Strain (CARDIA)     Difficulty of Paying Living Expenses: Patient declined   Food Insecurity: Unknown (12/28/2020)    Received from [facility] and [facility]    Hunger Vital Sign     Worried About Running Out of Food in the Last Year: Patient declined     Ran Out of Food in the Last Year: Patient declined   Transportation Needs: Unknown (12/28/2020)    Received from [facility] and [facility]    PRAPARE - Transportation     Lack of Transportation (Medical): Patient declined     Lack of Transportation (Non-Medical): Patient declined   [2]   Current Outpatient Medications   Medication Sig Dispense Refill    dicyclomine (BENTYL) 20 mg tablet Take 1 tablet (20 mg total) by mouth 4 (four) times daily before meals and nightly. 120 tablet 5    sertraline (ZOLOFT) 50 MG tablet Take 1 tablet (50 mg total) by mouth once daily. Start by cutting the pill in half and taking only half a tablet a day for the first week. 30 tablet 11     No current facility-administered medications for this visit.

## 2025-07-11 ENCOUNTER — OFFICE VISIT (OUTPATIENT)
Dept: FAMILY MEDICINE | Facility: HOSPITAL | Age: 25
End: 2025-07-11
Payer: MEDICAID

## 2025-07-11 VITALS
SYSTOLIC BLOOD PRESSURE: 119 MMHG | TEMPERATURE: 99 F | HEIGHT: 56 IN | DIASTOLIC BLOOD PRESSURE: 81 MMHG | RESPIRATION RATE: 19 BRPM | BODY MASS INDEX: 23.25 KG/M2 | OXYGEN SATURATION: 99 % | HEART RATE: 72 BPM | WEIGHT: 103.38 LBS

## 2025-07-11 DIAGNOSIS — S21.039A: ICD-10-CM

## 2025-07-11 DIAGNOSIS — M41.80 DEXTROSCOLIOSIS: Primary | ICD-10-CM

## 2025-07-11 PROCEDURE — 99214 OFFICE O/P EST MOD 30 MIN: CPT

## 2025-07-11 RX ORDER — CIPROFLOXACIN 500 MG/1
250 TABLET, FILM COATED ORAL EVERY 12 HOURS
Qty: 5 TABLET | Refills: 0 | Status: SHIPPED | OUTPATIENT
Start: 2025-07-11 | End: 2025-07-16

## 2025-07-11 RX ORDER — MUPIROCIN 20 MG/G
OINTMENT TOPICAL 3 TIMES DAILY
Qty: 30 G | Refills: 0 | Status: SHIPPED | OUTPATIENT
Start: 2025-07-11

## 2025-07-11 NOTE — PROGRESS NOTES
"Clinic Note  [facility]    Subjective:     Aniyah Anderson is a 24 y.o. year old who presents to clinic today for follow up.    PMHx: Anxiety/Depression, Bone abnormality, Fibromyalgia, IBS    *Infection of nipple piercing: Reports swelling & pustule on new nipple piercing. Denies fevers or chills.    Anxiety/Depression:  - Has noticed that she is slower to anger & get frustrated with family since starting medication. Does not desire dose increase at this time  - Current regimen: Sertraline 50mg    Bone abnormality:  Dextroscoliosis:  - Family history of same bone disease in father. "Gums tight, doesn't allow circulation to teeth." Bones reportedly deteriorate early. Father had hip surgery at 30. Dad was 5'7 in height. Mom was 5'1 in height. Patient is 4'8.  - Bone Age Study 2014: Patient was 14 years old. Bone age on imaging showed 18 years, 2.9 standard deviations above average.     Fibromyalgia:  - Diagnosed by Dr. Salome Segura MD, Rheumatologist at [facility]. Rheumatological lab work largely negative on patient's portal.     IBS:  - Previously with Dr. Eugene in [place]. EGD + Colonoscopy with mild gastritis & IH. Biopsies negative for IBD or colitis. Reports multiple BMs with occasional blood.  - 26: Evaluated by DM Dunbar (GI)  - Current regimen: Ibgard, Deny    Review of Systems negative except for symptoms mentioned in HPI    Gynecological History:  - Menstruation: Patient's last menstrual period was 2025 (exact date).  - Mammogram: N/A  - Pap smear:  - Sexual History: Sexually active  - Contraception: Condoms    Obstetric History:     Health Maintenance         Date Due Completion Date    Pneumococcal Vaccines (Age 0-49) (1 of 2 - PCV) 2020    Pap Smear Never done ---    TETANUS VACCINE 2022    COVID-19 Vaccine ( - - season) Never done ---    Influenza Vaccine (1) 2025 10/2/2020    Chlamydia Screening 2026 " 5/25/2025    RSV Vaccine (Age 60+ and Pregnant patients) (1 - 1-dose 75+ series) 09/24/2076 ---            Past Medical History:   Diagnosis Date    ADHD (attention deficit hyperactivity disorder)     Anxiety disorder, unspecified     Asthma     Depression        No past surgical history on file.    No family history on file.    Social History     Socioeconomic History    Marital status: Single   Tobacco Use    Smoking status: Some Days     Types: Cigars    Smokeless tobacco: Never    Tobacco comments:     Vape   Substance and Sexual Activity    Alcohol use: No     Comment: Ocassionally    Drug use: Yes     Types: Marijuana     Comment: Once every couple months    Sexual activity: Yes     Partners: Male, Female     Birth control/protection: Condom     Social Drivers of Health     Financial Resource Strain: Unknown (12/28/2020)    Received from [facility]    Overall Financial Resource Strain (CARDIA)     Difficulty of Paying Living Expenses: Patient declined   Food Insecurity: Unknown (12/28/2020)    Received from [facility]    Hunger Vital Sign     Worried About Running Out of Food in the Last Year: Patient declined     Ran Out of Food in the Last Year: Patient declined   Transportation Needs: Unknown (12/28/2020)    Received from [facility]    PRAPARE - Transportation     Lack of Transportation (Medical): Patient declined     Lack of Transportation (Non-Medical): Patient declined       Current Medications[1]    Review of patient's allergies indicates:  No Known Allergies      Objective:     Vitals:    07/11/25 0838   BP: 119/81   Pulse: 72   Resp: 19   Temp: 98.5 °F (36.9 °C)       Wt Readings from Last 1 Encounters:   07/11/25 46.9 kg (103 lb 6.3 oz)       Physical Exam  Constitutional:       General: She is not in acute distress.  Eyes:      General:         Right eye: No discharge.         Left eye: No  discharge.      Conjunctiva/sclera: Conjunctivae normal.   Cardiovascular:      Rate and Rhythm: Normal rate.   Pulmonary:      Effort: Pulmonary effort is normal. No respiratory distress.   Skin:     Comments: This portion of physical exam performed with Perlita Mcbride MA as chaperone  Bilateral nipple piercings  Left nipple with pustule containing some blood. No surrounding erythema   Neurological:      Mental Status: She is alert and oriented to person, place, and time.      Gait: Gait normal.   Psychiatric:         Mood and Affect: Mood normal.         Behavior: Behavior normal.         Assessment/Plan:     Aniyah was seen today for follow-up.    Diagnoses and all orders for this visit:    Dextroscoliosis  Chronic condition. Uncontrolled. Although level of scoliosis is mild, patient continues to be in chronic pain. Warrants referral to ortho for further evaluation for additional recommendations. Patient also has upcoming genetics appointment.  -     Ambulatory referral/consult to Orthopedics; Future    Piercing of nipple  Acute condition. Controlled. Patient hesitant to remove piercing as it would close. Discussed that it would be better to remove as hardware can often get infected and get re-pierced later. Agreed to observe for a couple of days and remove if not improving, or worsening, with antibiotics. No signs of systemic involvement on physical exam; patient ok for oral+topical combination. Encouraged warm compresses.  -     ciprofloxacin HCl (CIPRO) 500 MG tablet; Take 0.5 tablets (250 mg total) by mouth every 12 (twelve) hours. for 5 days  -     mupirocin (BACTROBAN) 2 % ointment; Apply topically 3 (three) times daily.    Follow up in about 3 months after patient has seen both genetics and ortho.    Case discussed with staff: Dr. Aleja Smith MD PGY-3  [facility]        [1]   Current Outpatient Medications   Medication Sig Dispense Refill    dicyclomine (BENTYL) 20 mg tablet Take 1  tablet (20 mg total) by mouth 4 (four) times daily before meals and nightly. 120 tablet 5    sertraline (ZOLOFT) 50 MG tablet Take 1 tablet (50 mg total) by mouth once daily. Start by cutting the pill in half and taking only half a tablet a day for the first week. 30 tablet 11    ciprofloxacin HCl (CIPRO) 500 MG tablet Take 0.5 tablets (250 mg total) by mouth every 12 (twelve) hours. for 5 days 5 tablet 0    mupirocin (BACTROBAN) 2 % ointment Apply topically 3 (three) times daily. 30 g 0     No current facility-administered medications for this visit.

## 2025-07-17 ENCOUNTER — TELEPHONE (OUTPATIENT)
Dept: OBSTETRICS AND GYNECOLOGY | Facility: CLINIC | Age: 25
End: 2025-07-17
Payer: MEDICAID

## 2025-07-17 NOTE — TELEPHONE ENCOUNTER
Called pt to scheduled a colposcopy with a Female MD. Scheduled on 08/29 at 3:30 pm with [facility]. Pt verbalized understanding

## 2025-07-17 NOTE — TELEPHONE ENCOUNTER
----- Message from Caron Muniz PA-C sent at 7/17/2025  1:58 PM CDT -----  Please call to schedule colpo after she reads her results that I sent  ----- Message -----  From: Lab, Background User  Sent: 7/16/2025  11:37 AM CDT  To: Caron Muniz PA-C

## 2025-08-06 ENCOUNTER — OFFICE VISIT (OUTPATIENT)
Dept: GASTROENTEROLOGY | Facility: CLINIC | Age: 25
End: 2025-08-06
Payer: MEDICAID

## 2025-08-06 DIAGNOSIS — K58.0 IRRITABLE BOWEL SYNDROME WITH DIARRHEA: Primary | ICD-10-CM

## 2025-08-06 PROCEDURE — 1160F RVW MEDS BY RX/DR IN RCRD: CPT | Mod: CPTII,95,, | Performed by: NURSE PRACTITIONER

## 2025-08-06 PROCEDURE — 1159F MED LIST DOCD IN RCRD: CPT | Mod: CPTII,95,, | Performed by: NURSE PRACTITIONER

## 2025-08-06 PROCEDURE — 98006 SYNCH AUDIO-VIDEO EST MOD 30: CPT | Mod: 95,,, | Performed by: NURSE PRACTITIONER

## 2025-08-06 NOTE — PROGRESS NOTES
Subjective:       Patient ID: Aniyah Anderson is a 24 y.o. female.    Chief Complaint: Follow-up    The patient location is: [place]  The chief complaint leading to consultation is: follow up    Visit type: audiovisual    Face to Face time with patient: 20 minutes  30 minutes of total time spent on the encounter, which includes face to face time and non-face to face time preparing to see the patient (eg, review of tests), Obtaining and/or reviewing separately obtained history, Documenting clinical information in the electronic or other health record, Independently interpreting results (not separately reported) and communicating results to the patient/family/caregiver, or Care coordination (not separately reported).         Each patient to whom he or she provides medical services by telemedicine is:  (1) informed of the relationship between the physician and patient and the respective role of any other health care provider with respect to management of the patient; and (2) notified that he or she may decline to receive medical services by telemedicine and may withdraw from such care at any time.    Notes:     25 y/o female with IBS-D presents to clinic for follow up. Patient initially seen in clinic to establish care with c/o diarrhea and rectal bleeding. Previously established with Dr. En Eugene in [place], [place]; last seen in 2023. Symptoms have improved since starting dicyclomine ACHS. No rectal bleeding.     Endoscopy History  - 10/12/2023: Internal hemorrhoids noted; otherwise exam normal; biopsies (-) IBD or microscopic colitis  - 11/17/2023 EGD: normal esophagus; gastritis biopsied; normal examined duodenum.    Past Medical History:   Diagnosis Date    ADHD (attention deficit hyperactivity disorder)     Anxiety disorder, unspecified     Asthma     Depression        History reviewed. No pertinent surgical history.    No family history on file.    Social History     Socioeconomic History    Marital status:  Single   Tobacco Use    Smoking status: Some Days     Types: Cigars    Smokeless tobacco: Never    Tobacco comments:     Vape   Substance and Sexual Activity    Alcohol use: No     Comment: Ocassionally    Drug use: Yes     Types: Marijuana     Comment: Once every couple months    Sexual activity: Yes     Partners: Male, Female     Birth control/protection: Condom     Social Drivers of Health     Financial Resource Strain: Unknown (12/28/2020)    Received from [facility]    Overall Financial Resource Strain (CARDIA)     Difficulty of Paying Living Expenses: Patient declined   Food Insecurity: Unknown (12/28/2020)    Received from [facility] and [facility]    Hunger Vital Sign     Worried About Running Out of Food in the Last Year: Patient declined     Ran Out of Food in the Last Year: Patient declined   Transportation Needs: Unknown (12/28/2020)    Received from [facility] and [facility]    PRAPARE - Transportation     Lack of Transportation (Medical): Patient declined     Lack of Transportation (Non-Medical): Patient declined       Review of Systems   Constitutional:  Negative for appetite change and unexpected weight change.   HENT:  Negative for trouble swallowing.    Respiratory:  Negative for shortness of breath.    Cardiovascular:  Negative for chest pain.   Gastrointestinal:  Positive for abdominal pain and diarrhea.   Hematological:  Negative for adenopathy. Does not bruise/bleed easily.   Psychiatric/Behavioral:  Negative for dysphoric mood.          Objective:     There were no vitals filed for this visit.       Physical Exam  Constitutional:       General: She is not in acute distress.     Appearance: Normal appearance. She is not ill-appearing.   HENT:      Head: Normocephalic.   Eyes:      Conjunctiva/sclera: Conjunctivae normal.   Pulmonary:      Effort: Pulmonary effort is normal. No respiratory distress.   Skin:      Coloration: Skin is not jaundiced or pale.   Neurological:      Mental Status: She is alert and oriented to person, place, and time.   Psychiatric:         Mood and Affect: Mood normal.         Behavior: Behavior normal.               Assessment:         ICD-10-CM ICD-9-CM   1. Irritable bowel syndrome with diarrhea  K58.0 564.1       Plan:       Irritable bowel syndrome with diarrhea  - IBS diet  - Continue Bentyl ACHS    Follow up in about 1 year (around 8/6/2026) for medication management, If symptoms worsen or fail to improve.     Patient's Medications   New Prescriptions    No medications on file   Previous Medications    DICYCLOMINE (BENTYL) 20 MG TABLET    Take 1 tablet (20 mg total) by mouth 4 (four) times daily before meals and nightly.    MUPIROCIN (BACTROBAN) 2 % OINTMENT    Apply topically 3 (three) times daily.    SERTRALINE (ZOLOFT) 50 MG TABLET    Take 1 tablet (50 mg total) by mouth once daily. Start by cutting the pill in half and taking only half a tablet a day for the first week.   Modified Medications    No medications on file   Discontinued Medications    METRONIDAZOLE (FLAGYL) 500 MG TABLET    Take 1 tablet (500 mg total) by mouth every 12 (twelve) hours.

## 2025-08-29 ENCOUNTER — PROCEDURE VISIT (OUTPATIENT)
Dept: OBSTETRICS AND GYNECOLOGY | Facility: CLINIC | Age: 25
End: 2025-08-29
Payer: MEDICAID

## 2025-08-29 DIAGNOSIS — Z30.013 ENCOUNTER FOR INITIAL PRESCRIPTION OF INJECTABLE CONTRACEPTIVE: Primary | ICD-10-CM

## 2025-08-29 RX ORDER — MEDROXYPROGESTERONE ACETATE 150 MG/ML
150 INJECTION, SUSPENSION INTRAMUSCULAR
Status: SHIPPED | OUTPATIENT
Start: 2025-08-29 | End: 2026-11-22

## 2025-08-31 ENCOUNTER — PATIENT MESSAGE (OUTPATIENT)
Dept: OBSTETRICS AND GYNECOLOGY | Facility: CLINIC | Age: 25
End: 2025-08-31
Payer: MEDICAID

## 2025-08-31 ENCOUNTER — NURSE TRIAGE (OUTPATIENT)
Dept: ADMINISTRATIVE | Facility: CLINIC | Age: 25
End: 2025-08-31
Payer: MEDICAID